# Patient Record
Sex: MALE | Race: WHITE | NOT HISPANIC OR LATINO
[De-identification: names, ages, dates, MRNs, and addresses within clinical notes are randomized per-mention and may not be internally consistent; named-entity substitution may affect disease eponyms.]

---

## 2017-02-01 ENCOUNTER — RX RENEWAL (OUTPATIENT)
Age: 22
End: 2017-02-01

## 2017-04-28 ENCOUNTER — MEDICATION RENEWAL (OUTPATIENT)
Age: 22
End: 2017-04-28

## 2017-05-22 ENCOUNTER — APPOINTMENT (OUTPATIENT)
Dept: CT IMAGING | Facility: HOSPITAL | Age: 22
End: 2017-05-22

## 2017-05-22 ENCOUNTER — OUTPATIENT (OUTPATIENT)
Dept: OUTPATIENT SERVICES | Facility: HOSPITAL | Age: 22
LOS: 1 days | End: 2017-05-22
Payer: COMMERCIAL

## 2017-05-22 PROCEDURE — 70486 CT MAXILLOFACIAL W/O DYE: CPT

## 2017-05-22 PROCEDURE — 70486 CT MAXILLOFACIAL W/O DYE: CPT | Mod: 26

## 2017-07-26 ENCOUNTER — RX RENEWAL (OUTPATIENT)
Age: 22
End: 2017-07-26

## 2017-08-07 ENCOUNTER — APPOINTMENT (OUTPATIENT)
Dept: PEDIATRICS | Facility: CLINIC | Age: 22
End: 2017-08-07
Payer: COMMERCIAL

## 2017-08-07 VITALS
HEIGHT: 75 IN | DIASTOLIC BLOOD PRESSURE: 68 MMHG | BODY MASS INDEX: 24.65 KG/M2 | WEIGHT: 198.25 LBS | SYSTOLIC BLOOD PRESSURE: 110 MMHG | HEART RATE: 66 BPM

## 2017-08-07 PROCEDURE — 90621 MENB-FHBP VACC 2/3 DOSE IM: CPT

## 2017-08-07 PROCEDURE — 96127 BRIEF EMOTIONAL/BEHAV ASSMT: CPT

## 2017-08-07 PROCEDURE — 99395 PREV VISIT EST AGE 18-39: CPT | Mod: 25

## 2017-08-07 PROCEDURE — 90471 IMMUNIZATION ADMIN: CPT

## 2017-08-11 ENCOUNTER — MEDICATION RENEWAL (OUTPATIENT)
Age: 22
End: 2017-08-11

## 2017-11-13 ENCOUNTER — MEDICATION RENEWAL (OUTPATIENT)
Age: 22
End: 2017-11-13

## 2018-02-15 ENCOUNTER — APPOINTMENT (OUTPATIENT)
Dept: PEDIATRICS | Facility: CLINIC | Age: 23
End: 2018-02-15
Payer: COMMERCIAL

## 2018-02-15 VITALS — TEMPERATURE: 97.8 F

## 2018-02-15 DIAGNOSIS — Z87.2 PERSONAL HISTORY OF DISEASES OF THE SKIN AND SUBCUTANEOUS TISSUE: ICD-10-CM

## 2018-02-15 PROCEDURE — 99214 OFFICE O/P EST MOD 30 MIN: CPT

## 2018-02-15 RX ORDER — MUPIROCIN 20 MG/G
2 OINTMENT TOPICAL 3 TIMES DAILY
Qty: 1 | Refills: 3 | Status: COMPLETED | COMMUNITY
Start: 2017-08-07 | End: 2018-02-15

## 2018-04-06 ENCOUNTER — MEDICATION RENEWAL (OUTPATIENT)
Age: 23
End: 2018-04-06

## 2018-05-04 ENCOUNTER — RX RENEWAL (OUTPATIENT)
Age: 23
End: 2018-05-04

## 2018-07-27 ENCOUNTER — APPOINTMENT (OUTPATIENT)
Dept: PEDIATRICS | Facility: CLINIC | Age: 23
End: 2018-07-27

## 2018-07-27 DIAGNOSIS — E56.9 VITAMIN DEFICIENCY, UNSPECIFIED: ICD-10-CM

## 2018-08-15 ENCOUNTER — CLINICAL ADVICE (OUTPATIENT)
Age: 23
End: 2018-08-15

## 2018-09-05 PROBLEM — Z23 NEED FOR VACCINATION: Status: ACTIVE | Noted: 2017-08-06

## 2018-09-05 NOTE — PHYSICAL EXAM
[General Appearance - Well Developed] : interactive [General Appearance - Well-Appearing] : well appearing [General Appearance - In No Acute Distress] : in no acute distress [Appearance Of Head] : the head was normocephalic [Sclera] : the conjunctiva were normal [Outer Ear] : the ears and nose were normal in appearance [Both Tympanic Membranes Were Examined] : both tympanic membranes were normal [Nasal Cavity] : the nasal mucosa and septum were normal [Examination Of The Oral Cavity] : the teeth, gums, and palate were normal [Oropharynx] : the oropharynx was normal  [Neck Cervical Mass (___cm)] : no neck mass was observed [Respiration, Rhythm And Depth] : normal respiratory rhythm and effort [Auscultation Breath Sounds / Voice Sounds] : clear bilateral breath sounds [Heart Rate And Rhythm] : heart rate and rhythm were normal [Heart Sounds] : normal S1 and S2 [Murmurs] : no murmurs [Bowel Sounds] : normal bowel sounds [Abdomen Soft] : soft [Abdomen Tenderness] : non-tender [Abdominal Distention] : nondistended [Musculoskeletal Exam: Normal Movement Of All Extremities] : normal movements of all extremities [Motor Tone] : muscle strength and tone were normal [No Visual Abnormalities] : no visible abnormailities [Deep Tendon Reflexes (DTR)] : deep tendon reflexes were 2+ and symmetric [Generalized Lymph Node Enlargement] : no lymphadenopathy [Skin Color & Pigmentation] : normal skin color and pigmentation [] : no significant rash [Skin Lesions] : no skin lesions [Initial Inspection: Infant Active And Alert] : active and alert [Penis Abnormality] : the penis was normal [Scrotum] : the scrotum was normal [Testes Mass (___cm)] : there were no testicular masses [Jluis Stage _____] : the Jluis stage for pubic hair development was [unfilled]

## 2018-09-06 ENCOUNTER — APPOINTMENT (OUTPATIENT)
Dept: PEDIATRICS | Facility: CLINIC | Age: 23
End: 2018-09-06
Payer: COMMERCIAL

## 2018-09-06 VITALS
HEART RATE: 93 BPM | OXYGEN SATURATION: 98 % | HEIGHT: 74.5 IN | BODY MASS INDEX: 29.49 KG/M2 | DIASTOLIC BLOOD PRESSURE: 82 MMHG | WEIGHT: 232.25 LBS | SYSTOLIC BLOOD PRESSURE: 132 MMHG

## 2018-09-06 DIAGNOSIS — M62.830 MUSCLE SPASM OF BACK: ICD-10-CM

## 2018-09-06 DIAGNOSIS — Z00.00 ENCOUNTER FOR GENERAL ADULT MEDICAL EXAMINATION W/OUT ABNORMAL FINDINGS: ICD-10-CM

## 2018-09-06 DIAGNOSIS — M54.5 LOW BACK PAIN: ICD-10-CM

## 2018-09-06 DIAGNOSIS — Z23 ENCOUNTER FOR IMMUNIZATION: ICD-10-CM

## 2018-09-06 PROCEDURE — 96160 PT-FOCUSED HLTH RISK ASSMT: CPT | Mod: 59

## 2018-09-06 PROCEDURE — 99395 PREV VISIT EST AGE 18-39: CPT | Mod: 25

## 2018-09-06 PROCEDURE — 90472 IMMUNIZATION ADMIN EACH ADD: CPT

## 2018-09-06 PROCEDURE — 90621 MENB-FHBP VACC 2/3 DOSE IM: CPT

## 2018-09-06 PROCEDURE — 90686 IIV4 VACC NO PRSV 0.5 ML IM: CPT

## 2018-09-06 PROCEDURE — 96127 BRIEF EMOTIONAL/BEHAV ASSMT: CPT

## 2018-09-06 PROCEDURE — 90471 IMMUNIZATION ADMIN: CPT

## 2018-09-06 RX ORDER — DEXTROAMPHETAMINE SACCHARATE, AMPHETAMINE ASPARTATE MONOHYDRATE, DEXTROAMPHETAMINE SULFATE AND AMPHETAMINE SULFATE 1.25; 1.25; 1.25; 1.25 MG/1; MG/1; MG/1; MG/1
5 CAPSULE, EXTENDED RELEASE ORAL
Qty: 25 | Refills: 0 | Status: COMPLETED | COMMUNITY
Start: 2018-09-06 | End: 2018-09-06

## 2018-09-06 RX ORDER — DEXTROAMPHETAMINE SACCHARATE, AMPHETAMINE ASPARTATE MONOHYDRATE, DEXTROAMPHETAMINE SULFATE AND AMPHETAMINE SULFATE 1.25; 1.25; 1.25; 1.25 MG/1; MG/1; MG/1; MG/1
5 CAPSULE, EXTENDED RELEASE ORAL
Qty: 25 | Refills: 0 | Status: ACTIVE | COMMUNITY
Start: 2018-09-06 | End: 1900-01-01

## 2018-09-06 RX ORDER — DEXTROAMPHETAMINE SACCHARATE, AMPHETAMINE ASPARTATE MONOHYDRATE, DEXTROAMPHETAMINE SULFATE AND AMPHETAMINE SULFATE 5; 5; 5; 5 MG/1; MG/1; MG/1; MG/1
20 CAPSULE, EXTENDED RELEASE ORAL
Qty: 30 | Refills: 0 | Status: DISCONTINUED | COMMUNITY
Start: 2017-08-11 | End: 2018-09-06

## 2018-09-06 NOTE — DISCUSSION/SUMMARY
[Normal Growth] : growth [Normal Development] : development  [No Elimination Concerns] : elimination [No Feeding Concerns] : feeding [No Skin Concerns] : skin [Normal Sleep Pattern] : sleep [No Medications] : ~He/She~ is not on any medications [Patient] : patient [Parent/Guardian] : parent/guardian [FreeTextEntry4] : ADHD [FreeTextEntry1] : 23 year old male patient presents for his annual well visit.\par Normal PE. Doing well except Adderall ER dose does not seem to be helping ADHD symptoms.  Will increase dose to 25mg X 1 week then 30 mg.  Patient will call in 1 week to let me know how medication is working and if any side effects.\par Chronic back pain: referred to ortho for evaluation.\par Trumemba #2 vaccine given today, and influenza vaccine. \par Immunizations are up to date.\par Routine lab work ordered.  Slips given.\par Return in 1 year for well visit. \par \par \par I recommended that the patient continue to participates in 60 minutes or more of physical activity a day.  He plays on [employer] team.  \par \par Discussed risks of tobacco, e-cigarettes, alcohol and drugs.  Emphasized adverse health effects and how even small amounts of alcohol or drugs can be dangerous.  Never drink alcohol or use drugs when driving. \par Discussed risks of pregnancy and STIs:  Abstinence is the safest protection from pregnancy and STIs. Plan to avoid risky places and relationships. If sexually active, you and your partner should correctly and consistently use a long-acting form of contraception such as birth control pills along with a condom.  \par \par \par \par  \par \par \par

## 2018-09-06 NOTE — HISTORY OF PRESENT ILLNESS
[Good Dental Hygiene] : Good [Diverse, Healthy Diet] : his current diet is diverse and healthy [Good] : good [Needs Immunization] : Needs immunizations [None] : The patient does not use dietary supplements [Exercises ___ x/Wk] : ~he/she~ gets exercise [unfilled] times per week [de-identified] : [employer] [FreeTextEntry2] : [employer] [FreeTextEntry5] : Senior in college; [employer] ([employer]), studying accounting and education/history [FreeTextEntry1] : 23 year old male here for his annual well visit.\par Patient has ADHD, not taking meds over the summer.  He felt the 20 mg dose of Adderall he was taking at college was not helping at all.  \par Has intermittent low back pain that never completely went away after injuring back wakeboarding 2 years ago.  Has a hard time bending down.  Never evaluated by ortho, originally went to ED but no Xrays or MRIs.

## 2018-09-06 NOTE — DEVELOPMENTAL MILESTONES
[Eats regular meals including adequate fruits and vegetables] : eats regular meals including adequate fruits and vegetables [Drinks non-sweetened liquids] : drinks non-sweetened liquids [Calcium source] : has a source for calcium [Has friends] : has friends [At least 1 hour of physical acitvity/day] : at least 1 hour of physical activity/day [Screen time (except for homework) less than 2 hours/day] : screen time (except for homework) less than 2 hours/day [Has interests/participates in community activities/volunteers] : has interests/participates in community activities/volunteers [Home is free of violence] : home is free of violence [Uses safety belts/safety equipment] : uses safety belts/safety equipment [Has peer relationships free of violence] : has peer relationships free of violence [Sexually Active] : The patient is sexually active [Always] : always [Has ways to cope with stress] : has ways to cope with stress [Displays self-confidence] : displays self-confidence [Uses tobacco/alcohol/drugs] : uses tobacco/alcohol/drugs [Has concerns about body or appearance] : has no concerns about body or appearance [Impaired/Distracted driving] : no impaired/distracted driving [Has problems with sleep] : has no problems with sleep [Gets depressed, anxious, or irritable / has mood swings] : does not get depressed, anxious, or irritable / has no mood swings [Has thoughts about hurting self or considered suicide] : has no thoughts about hurting self or considered suicide [FreeTextEntry5] : Has 4 siblings.  Living in house at college with 6 roommates.  [FreeTextEntry9] : Some alcohol. [de-identified] : Trouble concentrating, related to ADHD.

## 2018-11-09 ENCOUNTER — RX RENEWAL (OUTPATIENT)
Age: 23
End: 2018-11-09

## 2018-11-09 RX ORDER — DEXTROAMPHETAMINE SACCHARATE, AMPHETAMINE ASPARTATE MONOHYDRATE, DEXTROAMPHETAMINE SULFATE AND AMPHETAMINE SULFATE 6.25; 6.25; 6.25; 6.25 MG/1; MG/1; MG/1; MG/1
25 CAPSULE, EXTENDED RELEASE ORAL
Qty: 30 | Refills: 0 | Status: DISCONTINUED | COMMUNITY
Start: 2018-09-06 | End: 2018-11-09

## 2018-12-12 ENCOUNTER — MEDICATION RENEWAL (OUTPATIENT)
Age: 23
End: 2018-12-12

## 2019-01-19 ENCOUNTER — APPOINTMENT (OUTPATIENT)
Dept: PEDIATRICS | Facility: CLINIC | Age: 24
End: 2019-01-19
Payer: COMMERCIAL

## 2019-01-19 VITALS — TEMPERATURE: 97.6 F

## 2019-01-19 DIAGNOSIS — F43.21 ADJUSTMENT DISORDER WITH DEPRESSED MOOD: ICD-10-CM

## 2019-01-19 DIAGNOSIS — R53.81 OTHER MALAISE: ICD-10-CM

## 2019-01-19 DIAGNOSIS — R41.840 ATTENTION AND CONCENTRATION DEFICIT: ICD-10-CM

## 2019-01-19 DIAGNOSIS — F90.9 ATTENTION-DEFICIT HYPERACTIVITY DISORDER, UNSPECIFIED TYPE: ICD-10-CM

## 2019-01-19 PROCEDURE — 99214 OFFICE O/P EST MOD 30 MIN: CPT

## 2019-01-19 NOTE — HISTORY OF PRESENT ILLNESS
[de-identified] : Not feeling well [FreeTextEntry6] : Grandmother passed away January 7th.  Had exams between Jan 2nd-10th and had a hard time concentrating and did not do well, missed some assignments.  Would like a note for college asking explaining the situation so that he can make up  assignments to improve his grades.  [employer] in [employer] [facility] [employer] in Education.\par Feeling much better now.  Is taking Adderall for his ADHD which is working very well for him.

## 2019-01-19 NOTE — DISCUSSION/SUMMARY
[FreeTextEntry1] : 23 year old presents with history of ADHD  presents with need for letter for college.\par His grandmother past away the week of his exams (Jan 7th) and he had a hard time concentration due to grief.\par He is doing much better. \par Letter written.  Adderall was refilled.

## 2019-01-31 ENCOUNTER — RX RENEWAL (OUTPATIENT)
Age: 24
End: 2019-01-31

## 2019-02-05 ENCOUNTER — MEDICATION RENEWAL (OUTPATIENT)
Age: 24
End: 2019-02-05

## 2019-04-08 ENCOUNTER — MEDICATION RENEWAL (OUTPATIENT)
Age: 24
End: 2019-04-08

## 2019-05-07 ENCOUNTER — MEDICATION RENEWAL (OUTPATIENT)
Age: 24
End: 2019-05-07

## 2019-05-07 RX ORDER — DEXTROAMPHETAMINE SACCHARATE, AMPHETAMINE ASPARTATE MONOHYDRATE, DEXTROAMPHETAMINE SULFATE AND AMPHETAMINE SULFATE 7.5; 7.5; 7.5; 7.5 MG/1; MG/1; MG/1; MG/1
30 CAPSULE, EXTENDED RELEASE ORAL
Qty: 30 | Refills: 0 | Status: ACTIVE | COMMUNITY
Start: 2018-11-09 | End: 1900-01-01

## 2020-01-06 ENCOUNTER — RX RENEWAL (OUTPATIENT)
Age: 25
End: 2020-01-06

## 2021-05-11 ENCOUNTER — TRANSCRIPTION ENCOUNTER (OUTPATIENT)
Age: 26
End: 2021-05-11

## 2021-05-21 ENCOUNTER — TRANSCRIPTION ENCOUNTER (OUTPATIENT)
Age: 26
End: 2021-05-21

## 2021-09-20 ENCOUNTER — TRANSCRIPTION ENCOUNTER (OUTPATIENT)
Age: 26
End: 2021-09-20

## 2021-09-21 ENCOUNTER — EMERGENCY (EMERGENCY)
Facility: HOSPITAL | Age: 26
LOS: 1 days | Discharge: HOME | End: 2021-09-21
Attending: EMERGENCY MEDICINE | Admitting: EMERGENCY MEDICINE
Payer: COMMERCIAL

## 2021-09-21 VITALS
DIASTOLIC BLOOD PRESSURE: 79 MMHG | SYSTOLIC BLOOD PRESSURE: 125 MMHG | OXYGEN SATURATION: 97 % | HEART RATE: 85 BPM | RESPIRATION RATE: 16 BRPM

## 2021-09-21 VITALS
WEIGHT: 205.03 LBS | SYSTOLIC BLOOD PRESSURE: 124 MMHG | HEART RATE: 79 BPM | DIASTOLIC BLOOD PRESSURE: 80 MMHG | OXYGEN SATURATION: 96 % | TEMPERATURE: 98 F | RESPIRATION RATE: 18 BRPM

## 2021-09-21 PROCEDURE — 99283 EMERGENCY DEPT VISIT LOW MDM: CPT | Mod: 25

## 2021-09-21 PROCEDURE — 99283 EMERGENCY DEPT VISIT LOW MDM: CPT

## 2021-09-21 PROCEDURE — 90715 TDAP VACCINE 7 YRS/> IM: CPT

## 2021-09-21 PROCEDURE — 73140 X-RAY EXAM OF FINGER(S): CPT

## 2021-09-21 PROCEDURE — 73140 X-RAY EXAM OF FINGER(S): CPT | Mod: 26,RT

## 2021-09-21 PROCEDURE — 90471 IMMUNIZATION ADMIN: CPT

## 2021-09-21 RX ORDER — CEPHALEXIN 500 MG
1 CAPSULE ORAL
Qty: 20 | Refills: 0
Start: 2021-09-21 | End: 2021-09-25

## 2021-09-21 RX ORDER — CEPHALEXIN 500 MG
500 CAPSULE ORAL ONCE
Refills: 0 | Status: COMPLETED | OUTPATIENT
Start: 2021-09-21 | End: 2021-09-21

## 2021-09-21 RX ORDER — TETANUS TOXOID, REDUCED DIPHTHERIA TOXOID AND ACELLULAR PERTUSSIS VACCINE, ADSORBED 5; 2.5; 8; 8; 2.5 [IU]/.5ML; [IU]/.5ML; UG/.5ML; UG/.5ML; UG/.5ML
0.5 SUSPENSION INTRAMUSCULAR ONCE
Refills: 0 | Status: COMPLETED | OUTPATIENT
Start: 2021-09-21 | End: 2021-09-21

## 2021-09-21 RX ORDER — IBUPROFEN 200 MG
600 TABLET ORAL ONCE
Refills: 0 | Status: COMPLETED | OUTPATIENT
Start: 2021-09-21 | End: 2021-09-21

## 2021-09-21 RX ADMIN — Medication 600 MILLIGRAM(S): at 20:50

## 2021-09-21 RX ADMIN — Medication 500 MILLIGRAM(S): at 20:50

## 2021-09-21 RX ADMIN — TETANUS TOXOID, REDUCED DIPHTHERIA TOXOID AND ACELLULAR PERTUSSIS VACCINE, ADSORBED 0.5 MILLILITER(S): 5; 2.5; 8; 8; 2.5 SUSPENSION INTRAMUSCULAR at 20:50

## 2021-09-21 NOTE — ED PROVIDER NOTE - PATIENT PORTAL LINK FT
You can access the FollowMyHealth Patient Portal offered by [facility] by registering at the following website: [email or web address]. By joining [employer]’s FollowMyHealth portal, you will also be able to view your health information using other applications (apps) compatible with our system.

## 2021-09-21 NOTE — ED PROVIDER NOTE - PHYSICAL EXAMINATION
Gen: Well appearing in NAD  Head: NC/AT  Neck: trachea midline  Resp:  No distress  Ext: right middle finger: FROM, sensation intact, strength 5/5  Neuro:  A&O appears non focal  Skin:  right middle finger distal palmar tip with tense compartment. no nail involvement. 2 <0.5cm areas with dried blood  Psych:  Normal affect and mood

## 2021-09-21 NOTE — ED PROVIDER NOTE - OBJECTIVE STATEMENT
27 yo m dropped a barbell on his right middle finger about 20 minutes PTA. +pain and throbbing. +lac  tdap not utd. denies numbness, tingling, weakness

## 2021-09-21 NOTE — ED PROVIDER NOTE - CLINICAL SUMMARY MEDICAL DECISION MAKING FREE TEXT BOX
27 yo m dropped a barbell on his right middle finger about 20 minutes PTA. +pain and throbbing. +lac  tdap not utd. denies numbness, tingling, weakness  Skin:  right middle finger distal palmar tip with tense compartment. no nail involvement. 2 <0.5cm areas with dried blood  tdap, xray pain meds, abx

## 2021-09-21 NOTE — ED ADULT NURSE NOTE - CHIEF COMPLAINT QUOTE
Patient presents to ED from home complaining of right 3rd finger pain after dropping dumbbell on it.

## 2021-09-21 NOTE — ED PROVIDER NOTE - CARE PROVIDER_API CALL
David Araya)  Plastic Surgery  [address] [place] [address], [address] [place] [id number]  Phone: [phone]  Fax: [phone]  Follow Up Time:

## 2021-09-21 NOTE — ED PROVIDER NOTE - NSFOLLOWUPINSTRUCTIONS_ED_ALL_ED_FT
- wound care as per plastic surgeon  - follow up with plastic surgeon this friday   - take cephalexin antibiotic as directed  - take motrin for pain as needed      Crush Injury    WHAT YOU NEED TO KNOW:    A crush injury happens when part of your body is trapped under a heavy object, or trapped between objects. You may have one or more broken bones. You may also have tissue damage. The damage can cause pain, numbness, and weakness. A crush injury can cause serious problems that need immediate treatment.    DISCHARGE INSTRUCTIONS:    Medicines: You may need any of the following:   •Prescription pain medicine may be given. Ask how to take this medicine safely.      •Antibiotics prevent or fight a bacterial infection.      •Take your medicine as directed. Contact your healthcare provider if you think your medicine is not helping or if you have side effects. Tell him of her if you are allergic to any medicine. Keep a list of the medicines, vitamins, and herbs you take. Include the amounts, and when and why you take them. Bring the list or the pill bottles to follow-up visits. Carry your medicine list with you in case of an emergency.      Call 911 for any of the following:   •You have chest pain, shortness of breath, or cannot think clearly.          Return to the emergency department if:   •The skin near the injured area turns blue or white or feels cold and numb.      •You feel pain that increases when you stretch or bend the injured area.      •The injured area swells or feels tight or hard.      •You have pale or shiny skin near your injury.      •You have numbness or trouble moving your injured arm or leg.      •Your wound is draining pus or smells bad.      •Your pain or swelling does not go away or gets worse, even after you take medicine.      •Blood soaks through your bandage or cast.      Contact your healthcare provider if:   •You have questions or concerns about your condition or care.          Follow up with your healthcare provider as directed: You may need more x-rays, or a cast for a broken bone. You may also need treatment for muscle, nerve, or kidney damage. Your healthcare provider may refer you to an orthopedic surgeon or other specialist. Write down your questions so you remember to ask them during your visits.    Apply ice: Ice helps decrease pain and swelling. Ice may also help decrease tissue damage. Use an ice pack, or put crushed ice in a plastic bag. Cover it with a towel. Apply it to the injured area for 20 minutes every hour, or as directed. Ask your healthcare provider how many times each day to apply ice, and for how many days.    Elevate the injured area as directed: If possible, raise the area as often as you can. This will help decrease swelling and pain. Prop it on pillows to keep it elevated comfortably.     Do not smoke: Smoking can cause tissue damage and delay healing. Ask your healthcare provider for more information if you currently smoke and need help quitting.    Go to therapy as directed: A physical therapist can teach you exercises to help improve movement and strength. Physical therapy can also help decrease pain and loss of function. An occupational therapist can help you find ways to do daily activities and care for yourself.

## 2021-09-21 NOTE — ED ADULT NURSE NOTE - OBJECTIVE STATEMENT
Pt states he was at the gym when he dropped a weight on his finger. Bleeding controlled. Pain is 10/10 as per patient.

## 2021-09-21 NOTE — ED PROVIDER NOTE - ATTENDING CONTRIBUTION TO CARE
pt c/o pain and injury to R middle finger after dropping barbell on finger at gym just pta tonight. assoc c bleeding.    exam:   General: well appearing, NAD.   cor: RRR, s1s2, 2+rad pulses.   lungs: no resp distress.   neuro: a&ox3, cn2-12 intact, nonfocal  MSK: R 3rd finger distal finger with moderate swelling , moderate ttp with 2 linear lacs transverse, about 6mm each with dried blood . no active bleeding. nail intact, mild pallor to tip of finger. cap refill normal. nail intact.  FDS/FDP/extensor intact. + gross distal sensation    AP: crush injury to R middle finger with laceration, swelling, mild pallor.  check xr r/o fx. abx prophylaxis. po analgesics. update Td.  hand consulted for crush injury, swelling/pallor. r/o open fx.

## 2021-11-23 ENCOUNTER — EMERGENCY (EMERGENCY)
Facility: HOSPITAL | Age: 26
LOS: 1 days | Discharge: HOME | End: 2021-11-23
Attending: EMERGENCY MEDICINE | Admitting: EMERGENCY MEDICINE
Payer: COMMERCIAL

## 2021-11-23 VITALS
HEART RATE: 88 BPM | SYSTOLIC BLOOD PRESSURE: 146 MMHG | WEIGHT: 210.1 LBS | OXYGEN SATURATION: 99 % | RESPIRATION RATE: 18 BRPM | DIASTOLIC BLOOD PRESSURE: 87 MMHG | TEMPERATURE: 97 F | HEIGHT: 75 IN

## 2021-11-23 PROCEDURE — 99283 EMERGENCY DEPT VISIT LOW MDM: CPT

## 2021-11-23 PROCEDURE — 73130 X-RAY EXAM OF HAND: CPT

## 2021-11-23 PROCEDURE — 99283 EMERGENCY DEPT VISIT LOW MDM: CPT | Mod: 25

## 2021-11-23 PROCEDURE — 73130 X-RAY EXAM OF HAND: CPT | Mod: 26,RT

## 2021-11-23 NOTE — ED ADULT NURSE NOTE - OBJECTIVE STATEMENT
pt a&ox3 ambulatory to ED c/o R hand redness and swelling. Pt had puncture wound a couple days ago from a nail to knuckle. Pt is UTD with tdap. Denies fever/chills.

## 2021-11-23 NOTE — ED ADULT TRIAGE NOTE - CHIEF COMPLAINT QUOTE
Pt was punctured by nail to the top of right hand x 2 days ago and still having pain and swelling. Had tetanus shot last month

## 2021-11-23 NOTE — ED PROVIDER NOTE - MUSCULOSKELETAL, MLM
mild swelling around the R 3rd MCP with focal ttp over the knuckle. able to range all fingers, mild swelling with mild erythema localized over the 3rd knuckle, without streaking up the arm

## 2021-11-23 NOTE — ED PROVIDER NOTE - PATIENT PORTAL LINK FT
You can access the FollowMyHealth Patient Portal offered by [facility] by registering at the following website: [email or web address]. By joining [employer]’s FollowMyHealth portal, you will also be able to view your health information using other applications (apps) compatible with our system.

## 2021-11-23 NOTE — ED PROVIDER NOTE - NSFOLLOWUPINSTRUCTIONS_ED_ALL_ED_FT
-- If there is no improvement in your swelling or redness in about 48 hours follow up with Hand surgeon referral.    -- Return to the ER for worsening or persistent symptoms, and/or ANY NEW OR CONCERNING SYMPTOMS.    -- If you have difficulty following up, please call: [phone] to obtain a [facility] doctor or specialist who takes your insurance in your area.

## 2021-11-23 NOTE — ED PROVIDER NOTE - CARE PROVIDER_API CALL
Jacob Armando)  Plastic Surgery; Surgery  [address], [address]  [place] [address]  Phone: [phone]  Fax: [phone]  Follow Up Time:

## 2021-11-23 NOTE — ED PROVIDER NOTE - OBJECTIVE STATEMENT
pt states that on sudden by accident he had a puncture wound into his R 3rd knuckle from a nail and comes to ER today because of persistent mild swelling, denies any fever, has minimal pain but can't make a tight fist.

## 2021-11-24 ENCOUNTER — TRANSCRIPTION ENCOUNTER (OUTPATIENT)
Age: 26
End: 2021-11-24

## 2022-03-16 ENCOUNTER — EMERGENCY (EMERGENCY)
Facility: HOSPITAL | Age: 27
LOS: 1 days | Discharge: HOME | End: 2022-03-16
Attending: EMERGENCY MEDICINE | Admitting: EMERGENCY MEDICINE
Payer: COMMERCIAL

## 2022-03-16 VITALS
DIASTOLIC BLOOD PRESSURE: 80 MMHG | WEIGHT: 231.49 LBS | HEIGHT: 75 IN | HEART RATE: 80 BPM | OXYGEN SATURATION: 98 % | TEMPERATURE: 98 F | RESPIRATION RATE: 17 BRPM | SYSTOLIC BLOOD PRESSURE: 140 MMHG

## 2022-03-16 PROCEDURE — 99283 EMERGENCY DEPT VISIT LOW MDM: CPT

## 2022-03-16 NOTE — ED ADULT NURSE NOTE - OBJECTIVE STATEMENT
26 yr male ambulatory to the ED A&Ox4 , no medical hx presents to the ED s/p laceration.   Pt is a  and " nicked"  the back of his right hand on plastic yesterday afternoon.    Denies any fevers, chills,  motor/sensory deficits or any other complaints.    UTD tetanus. + curved 1.5 cm noted to the rt yris.  +radial pulse.  cap refill < 2 secs. Skin warm, dry and normal for race.

## 2022-03-16 NOTE — ED PROVIDER NOTE - NS ED ATTENDING STATEMENT MOD
This was a shared visit with the JOSIAH. I reviewed and verified the documentation and independently performed the documented:

## 2022-03-16 NOTE — ED PROVIDER NOTE - NSFOLLOWUPINSTRUCTIONS_ED_ALL_ED_FT
Follow up with your primary physician for a post hospital visit within 48 hours, taking all results from the ER to be reviewed.    Keep the site clean /covered as directed. Complete the antibiotic Augmentin 875mg 1 tab twice a day for 7 days. If any fevers, chills, redness, any worsening, concerning  or new signs or symptoms return to the ER.

## 2022-03-16 NOTE — ED PROVIDER NOTE - OBJECTIVE STATEMENT
27 yo male, no medical hx comes to the ED sp laceration.   Pt is a  and " nicked"  the back of his right hand on plastic yesterday afternoon.    Denies any fevers, chills,  motor/sensory deficits or any other complaints.    UTD with a tetanus shot.

## 2022-03-16 NOTE — ED PROVIDER NOTE - ATTENDING CONTRIBUTION TO CARE
This was shared visit with JOSIAH. I have reviewed and verified the documentation and independently performed the documented history, exam and mdm  25 yo male, no medical hx comes to the ED sp laceration.   Pt is a  and " nicked"  the back of his right hand on plastic yesterday afternoon.    Denies any fevers, chills,  motor/sensory deficits or any other complaints.    UTD with a tetanus shot.   PE : infected wound on right hand knuckle of middle finger   pt was explained that wound can not be repaired as it is already infected

## 2022-03-16 NOTE — ED PROVIDER NOTE - PATIENT PORTAL LINK FT
You can access the FollowMyHealth Patient Portal offered by [facility] by registering at the following website: [email or web address]. By joining [employer]’s FollowMyHealth portal, you will also be able to view your health information using other applications (apps) compatible with our system.

## 2022-03-16 NOTE — ED ADULT NURSE NOTE - DRUG PRE-SCREENING (DAST -1)
"Subjective:     Haroldo Bass is a 36 y.o. male who presents for Body Aches (congestion, fever, X 1 day )       HPI   Pt with subjective fevers and body aches for the past 24 hours  Pt is not coughing much   Has been mildly congested   Eyes watery   Has a sore throat   No ear pain   No abdominal pain, nausea, vomiting, or diarrhea   Has been taking pseudoephedrine and acetaminophen without much improvement in symptoms     Review of Systems   Constitutional: Positive for malaise/fatigue. Negative for chills and fever.   HENT: Positive for congestion and sore throat. Negative for ear discharge and ear pain.    Eyes: Positive for discharge.   Respiratory: Negative for cough and shortness of breath.    Cardiovascular: Negative for chest pain.   Gastrointestinal: Negative for abdominal pain, diarrhea, nausea and vomiting.   Musculoskeletal: Positive for myalgias.   Skin: Negative for rash.   Neurological: Negative for headaches.      PMH: No hx of asthma   MEDS: No chronic medication   ALLERGIES: No Known Allergies  SURGHX: None  SOCHX: Non-smoker   FH: Family history was reviewed, no pertinent findings to report     Objective:   /74   Pulse 64   Temp 37 °C (98.6 °F)   Resp 18   Ht 1.803 m (5' 11\")   Wt 96.2 kg (212 lb)   SpO2 99%   BMI 29.57 kg/m²   Physical Exam   Constitutional: He appears well-developed and well-nourished. No distress.   HENT:   Head: Normocephalic and atraumatic.   Right Ear: External ear normal.   Nose: Nose normal.   Mouth/Throat: Oropharynx is clear and moist. No oropharyngeal exudate.   Left ear canal clear, left TM with erythema and purulent effusion, no perforation    Eyes: Pupils are equal, round, and reactive to light. Conjunctivae and EOM are normal. Right eye exhibits no discharge. Left eye exhibits no discharge. No scleral icterus.   Neck: Normal range of motion. No tracheal deviation present.   Cardiovascular: Normal rate, regular rhythm and normal heart sounds.  Exam reveals " no gallop and no friction rub.    No murmur heard.  Pulmonary/Chest: Effort normal and breath sounds normal. No respiratory distress. He has no wheezes. He has no rales.   Musculoskeletal: Normal range of motion.   Neurological: He is alert.   Skin: Skin is warm and dry. No rash noted. He is not diaphoretic.   Psychiatric: He has a normal mood and affect.        Assessment/Plan:   Assessment    1. Flu-like symptoms  2. Acute suppurative otitis media of left ear without spontaneous rupture of tympanic membrane, recurrence not specified  Pt with left sided AOM and systemically feeling ill.  Though his left ear does not hurt him, his systemic symptoms are likely from this infection.  Point of care influenza negative.  Discussed empiric treatment with antibiotics versus symptomatic care and patient would prefer to try antibiotics for his ear infection.  F/U if not improving in next few days.    - POCT Influenza A/B  - POCT Rapid Strep A  - amoxicillin (AMOXIL) 500 MG Cap; Take 1 Cap by  Case discussed, results reviewed and agree with treatment plan as outlined.  Dr. eBrnard   mouth 3 times a day.  Dispense: 30 Cap; Refill: 0     Statement Selected

## 2022-03-16 NOTE — ED PROVIDER NOTE - CLINICAL SUMMARY MEDICAL DECISION MAKING FREE TEXT BOX
25 yo male, no medical hx comes to the ED sp laceration.   Pt is a  and " nicked"  the back of his right hand on plastic yesterday afternoon.    Denies any fevers, chills,  motor/sensory deficits or any other complaints.    UTD with a tetanus shot.     + 1.5 cm horsehoe shaped lac back right hand 36d kunckle, + pus.   UTD with tetanus shot. Will clean and give AB.

## 2022-03-22 ENCOUNTER — TRANSCRIPTION ENCOUNTER (OUTPATIENT)
Age: 27
End: 2022-03-22

## 2023-11-11 ENCOUNTER — NON-APPOINTMENT (OUTPATIENT)
Age: 28
End: 2023-11-11

## 2024-02-07 ENCOUNTER — NON-APPOINTMENT (OUTPATIENT)
Age: 29
End: 2024-02-07

## 2024-06-03 ENCOUNTER — NON-APPOINTMENT (OUTPATIENT)
Age: 29
End: 2024-06-03

## 2025-04-02 PROBLEM — F43.20 GRIEF REACTION: Status: ACTIVE | Noted: 2019-01-19

## 2025-08-03 ENCOUNTER — NON-APPOINTMENT (OUTPATIENT)
Age: 30
End: 2025-08-03

## 2025-08-25 ENCOUNTER — APPOINTMENT (OUTPATIENT)
Dept: MRI IMAGING | Facility: HOSPITAL | Age: 30
End: 2025-08-25